# Patient Record
Sex: MALE | Race: ASIAN | NOT HISPANIC OR LATINO | URBAN - METROPOLITAN AREA
[De-identification: names, ages, dates, MRNs, and addresses within clinical notes are randomized per-mention and may not be internally consistent; named-entity substitution may affect disease eponyms.]

---

## 2017-05-30 ENCOUNTER — INPATIENT (INPATIENT)
Facility: HOSPITAL | Age: 57
LOS: 2 days | Discharge: HOME | End: 2017-06-02
Attending: INTERNAL MEDICINE | Admitting: INTERNAL MEDICINE
Payer: MEDICAID

## 2017-05-30 VITALS
RESPIRATION RATE: 18 BRPM | TEMPERATURE: 98 F | OXYGEN SATURATION: 99 % | DIASTOLIC BLOOD PRESSURE: 81 MMHG | HEART RATE: 106 BPM | WEIGHT: 139.99 LBS | SYSTOLIC BLOOD PRESSURE: 122 MMHG | HEIGHT: 64 IN

## 2017-05-30 LAB
ALBUMIN SERPL ELPH-MCNC: 2.6 G/DL — LOW (ref 3.3–5)
ALP SERPL-CCNC: 171 U/L — HIGH (ref 40–120)
ALT FLD-CCNC: 37 U/L — SIGNIFICANT CHANGE UP (ref 12–78)
ANION GAP SERPL CALC-SCNC: 11 MMOL/L — SIGNIFICANT CHANGE UP (ref 5–17)
APPEARANCE UR: CLEAR — SIGNIFICANT CHANGE UP
APTT BLD: 33.3 SEC — SIGNIFICANT CHANGE UP (ref 27.5–37.4)
AST SERPL-CCNC: 43 U/L — HIGH (ref 15–37)
BASOPHILS # BLD AUTO: 0.1 K/UL — SIGNIFICANT CHANGE UP (ref 0–0.2)
BASOPHILS NFR BLD AUTO: 0.6 % — SIGNIFICANT CHANGE UP (ref 0–2)
BILIRUB SERPL-MCNC: 0.5 MG/DL — SIGNIFICANT CHANGE UP (ref 0.2–1.2)
BILIRUB UR-MCNC: NEGATIVE — SIGNIFICANT CHANGE UP
BLD GP AB SCN SERPL QL: SIGNIFICANT CHANGE UP
BUN SERPL-MCNC: 42 MG/DL — HIGH (ref 7–23)
CALCIUM SERPL-MCNC: 8.4 MG/DL — LOW (ref 8.5–10.1)
CHLORIDE SERPL-SCNC: 96 MMOL/L — SIGNIFICANT CHANGE UP (ref 96–108)
CK MB BLD-MCNC: <0.6 % — SIGNIFICANT CHANGE UP (ref 0–3.5)
CK MB CFR SERPL CALC: <0.5 NG/ML — SIGNIFICANT CHANGE UP (ref 0.5–3.6)
CK SERPL-CCNC: 79 U/L — SIGNIFICANT CHANGE UP (ref 26–308)
CO2 SERPL-SCNC: 25 MMOL/L — SIGNIFICANT CHANGE UP (ref 22–31)
COLOR SPEC: YELLOW — SIGNIFICANT CHANGE UP
CREAT SERPL-MCNC: 1.85 MG/DL — HIGH (ref 0.5–1.3)
DIFF PNL FLD: NEGATIVE — SIGNIFICANT CHANGE UP
EOSINOPHIL # BLD AUTO: 0.1 K/UL — SIGNIFICANT CHANGE UP (ref 0–0.5)
EOSINOPHIL NFR BLD AUTO: 0.7 % — SIGNIFICANT CHANGE UP (ref 0–6)
ERYTHROCYTE [SEDIMENTATION RATE] IN BLOOD: 91 MM/HR — HIGH (ref 0–20)
GLUCOSE SERPL-MCNC: 150 MG/DL — HIGH (ref 70–99)
GLUCOSE UR QL: NEGATIVE MG/DL — SIGNIFICANT CHANGE UP
HCT VFR BLD CALC: 32.2 % — LOW (ref 39–50)
HGB BLD-MCNC: 10.8 G/DL — LOW (ref 13–17)
INR BLD: 1.36 RATIO — HIGH (ref 0.88–1.16)
KETONES UR-MCNC: NEGATIVE — SIGNIFICANT CHANGE UP
LACTATE SERPL-SCNC: 1.4 MMOL/L — SIGNIFICANT CHANGE UP (ref 0.7–2)
LEUKOCYTE ESTERASE UR-ACNC: NEGATIVE — SIGNIFICANT CHANGE UP
LYMPHOCYTES # BLD AUTO: 1.3 K/UL — SIGNIFICANT CHANGE UP (ref 1–3.3)
LYMPHOCYTES # BLD AUTO: 11.3 % — LOW (ref 13–44)
MCHC RBC-ENTMCNC: 23.9 PG — LOW (ref 27–34)
MCHC RBC-ENTMCNC: 33.4 GM/DL — SIGNIFICANT CHANGE UP (ref 32–36)
MCV RBC AUTO: 71.5 FL — LOW (ref 80–100)
MONOCYTES # BLD AUTO: 0.9 K/UL — SIGNIFICANT CHANGE UP (ref 0–0.9)
MONOCYTES NFR BLD AUTO: 7.8 % — SIGNIFICANT CHANGE UP (ref 2–14)
NEUTROPHILS # BLD AUTO: 8.9 K/UL — HIGH (ref 1.8–7.4)
NEUTROPHILS NFR BLD AUTO: 79.5 % — HIGH (ref 43–77)
NITRITE UR-MCNC: NEGATIVE — SIGNIFICANT CHANGE UP
PH UR: 5 — SIGNIFICANT CHANGE UP (ref 5–8)
PLATELET # BLD AUTO: 350 K/UL — SIGNIFICANT CHANGE UP (ref 150–400)
POTASSIUM SERPL-MCNC: 4.1 MMOL/L — SIGNIFICANT CHANGE UP (ref 3.5–5.3)
POTASSIUM SERPL-SCNC: 4.1 MMOL/L — SIGNIFICANT CHANGE UP (ref 3.5–5.3)
PROT SERPL-MCNC: 8.4 GM/DL — HIGH (ref 6–8.3)
PROT UR-MCNC: 30 MG/DL
PROTHROM AB SERPL-ACNC: 14.9 SEC — HIGH (ref 9.8–12.7)
RBC # BLD: 4.5 M/UL — SIGNIFICANT CHANGE UP (ref 4.2–5.8)
RBC # FLD: 14.7 % — SIGNIFICANT CHANGE UP (ref 11–15)
SODIUM SERPL-SCNC: 132 MMOL/L — LOW (ref 135–145)
SP GR SPEC: 1.01 — SIGNIFICANT CHANGE UP (ref 1.01–1.02)
TROPONIN I SERPL-MCNC: <.015 NG/ML — SIGNIFICANT CHANGE UP (ref 0.01–0.04)
UROBILINOGEN FLD QL: NEGATIVE MG/DL — SIGNIFICANT CHANGE UP
WBC # BLD: 11.2 K/UL — HIGH (ref 3.8–10.5)
WBC # FLD AUTO: 11.2 K/UL — HIGH (ref 3.8–10.5)

## 2017-05-30 PROCEDURE — 70551 MRI BRAIN STEM W/O DYE: CPT | Mod: 26

## 2017-05-30 PROCEDURE — 99285 EMERGENCY DEPT VISIT HI MDM: CPT | Mod: 25

## 2017-05-30 PROCEDURE — 62270 DX LMBR SPI PNXR: CPT

## 2017-05-30 PROCEDURE — 72148 MRI LUMBAR SPINE W/O DYE: CPT | Mod: 26

## 2017-05-30 PROCEDURE — 71010: CPT | Mod: 26

## 2017-05-30 PROCEDURE — 70450 CT HEAD/BRAIN W/O DYE: CPT | Mod: 26

## 2017-05-30 PROCEDURE — 72100 X-RAY EXAM L-S SPINE 2/3 VWS: CPT | Mod: 26

## 2017-05-30 RX ORDER — MORPHINE SULFATE 50 MG/1
4 CAPSULE, EXTENDED RELEASE ORAL ONCE
Qty: 0 | Refills: 0 | Status: DISCONTINUED | OUTPATIENT
Start: 2017-05-30 | End: 2017-05-30

## 2017-05-30 RX ORDER — SODIUM CHLORIDE 9 MG/ML
1000 INJECTION INTRAMUSCULAR; INTRAVENOUS; SUBCUTANEOUS ONCE
Qty: 0 | Refills: 0 | Status: COMPLETED | OUTPATIENT
Start: 2017-05-30 | End: 2017-05-30

## 2017-05-30 RX ADMIN — SODIUM CHLORIDE 2000 MILLILITER(S): 9 INJECTION INTRAMUSCULAR; INTRAVENOUS; SUBCUTANEOUS at 23:57

## 2017-05-30 RX ADMIN — MORPHINE SULFATE 4 MILLIGRAM(S): 50 CAPSULE, EXTENDED RELEASE ORAL at 23:57

## 2017-05-30 NOTE — ED PROVIDER NOTE - PHYSICAL EXAMINATION
Gen: Alert, NAD  Head: NC, AT, PERRL, EOMI, normal lids/conjunctiva  ENT: B TM WNL, normal hearing, patent oropharynx without erythema/exudate, uvula midline  Neck: +supple, no tenderness/meningismus/JVD, +Trachea midline  Pulm: Bilateral BS, normal resp effort, no wheeze/stridor/retractions  CV: RRR, no M/R/G, +dist pulses  Abd: soft, NT/ND, +BS, no hepatosplenomegaly  Mskel: strength 5/5 throughout.  sensation intact.  bilateral knee swelling and unable to bend knees.  not erythematous or warm to touch.    Neuro: AAOx3, no sensory/motor deficits, CN 2-12 intact

## 2017-05-30 NOTE — ED ADULT NURSE NOTE - CHIEF COMPLAINT QUOTE
"Pain from the waist down" Reports having pain from waist down starting 4 days ago, with urinary difficulties and trouble walking. Denies fall, trauma, injury, fever, chills, nausea, vomiting.

## 2017-05-30 NOTE — ED PROVIDER NOTE - OBJECTIVE STATEMENT
57yoM; with no signif pmh; now p/w back pain and lower extremity weakness. patient states he has been having back pain--lower back pain, x3 days, progressively worsening, with LE pain and weakness. denies numbness/tingling. c/o bowel and bladder retention...difficulty but able to go. also c/o bilateral upper extremity muscular atrophy and weakness. denies f/c/s. denies recent viral illness. denies trauma. denies travel. 57yoM; with no signif pmh; now p/w back pain and lower extremity weakness. patient states he has been having back pain--lower back pain, x3 days, progressively worsening, with LE pain and weakness. denies numbness/tingling. c/o bowel and bladder retention...difficulty but able to go. also c/o bilateral upper extremity muscular atrophy and weakness. also c/o left eye blurry vision intermittently.  denies f/c/s. denies recent viral illness. denies trauma. denies travel.

## 2017-05-31 DIAGNOSIS — G61.0 GUILLAIN-BARRE SYNDROME: ICD-10-CM

## 2017-05-31 DIAGNOSIS — M25.461 EFFUSION, RIGHT KNEE: ICD-10-CM

## 2017-05-31 DIAGNOSIS — N28.9 DISORDER OF KIDNEY AND URETER, UNSPECIFIED: ICD-10-CM

## 2017-05-31 DIAGNOSIS — Z29.9 ENCOUNTER FOR PROPHYLACTIC MEASURES, UNSPECIFIED: ICD-10-CM

## 2017-05-31 DIAGNOSIS — Z11.59 ENCOUNTER FOR SCREENING FOR OTHER VIRAL DISEASES: ICD-10-CM

## 2017-05-31 LAB
APPEARANCE CSF: CLEAR — SIGNIFICANT CHANGE UP
APPEARANCE CSF: CLEAR — SIGNIFICANT CHANGE UP
APPEARANCE SPUN FLD: COLORLESS — SIGNIFICANT CHANGE UP
APPEARANCE SPUN FLD: COLORLESS — SIGNIFICANT CHANGE UP
BACTERIA # UR AUTO: ABNORMAL
CHOLEST SERPL-MCNC: 156 MG/DL — SIGNIFICANT CHANGE UP (ref 10–199)
CMV IGG FLD QL: 4.6 U/ML — HIGH
CMV IGG SERPL-IMP: POSITIVE
CMV IGM FLD-ACNC: <8 AU/ML — SIGNIFICANT CHANGE UP
CMV IGM SERPL QL: NEGATIVE — SIGNIFICANT CHANGE UP
COLOR CSF: SIGNIFICANT CHANGE UP
COLOR CSF: SIGNIFICANT CHANGE UP
CRP SERPL-MCNC: 25.2 MG/DL — HIGH (ref 0–0.4)
EBV EA AB SER IA-ACNC: <5 U/ML — SIGNIFICANT CHANGE UP
EBV EA AB TITR SER IF: POSITIVE
EBV EA IGG SER-ACNC: NEGATIVE — SIGNIFICANT CHANGE UP
EBV NA IGG SER IA-ACNC: 75.7 U/ML — HIGH
EBV PATRN SPEC IB-IMP: SIGNIFICANT CHANGE UP
EBV VCA IGG AVIDITY SER QL IA: POSITIVE
EBV VCA IGM SER IA-ACNC: 169 U/ML — HIGH
EBV VCA IGM SER IA-ACNC: <10 U/ML — SIGNIFICANT CHANGE UP
EBV VCA IGM TITR FLD: NEGATIVE — SIGNIFICANT CHANGE UP
EPI CELLS # UR: SIGNIFICANT CHANGE UP
GLUCOSE CSF-MCNC: 59 MG/DL — SIGNIFICANT CHANGE UP (ref 40–70)
GRAM STN FLD: SIGNIFICANT CHANGE UP
GRAN CASTS # UR COMP ASSIST: ABNORMAL /LPF
HBA1C BLD-MCNC: 6.6 % — HIGH (ref 4–5.6)
HCT VFR BLD CALC: 28.3 % — LOW (ref 39–50)
HDLC SERPL-MCNC: 7 MG/DL — LOW (ref 40–125)
HGB BLD-MCNC: 9.4 G/DL — LOW (ref 13–17)
HIV 1+2 AB+HIV1 P24 AG SERPL QL IA: SIGNIFICANT CHANGE UP
HSV1 IGG SER-ACNC: 26.1 INDEX — HIGH
HSV1 IGG SERPL QL IA: POSITIVE
HSV2 IGG FLD-ACNC: 0.2 INDEX — SIGNIFICANT CHANGE UP
HSV2 IGG SERPL QL IA: NEGATIVE — SIGNIFICANT CHANGE UP
HYALINE CASTS # UR AUTO: ABNORMAL /LPF
LABORATORY COMMENT REPORT: SIGNIFICANT CHANGE UP
LIPID PNL WITH DIRECT LDL SERPL: 100 MG/DL — SIGNIFICANT CHANGE UP
M PNEUMO IGG SER IA-ACNC: 0.79 INDEX — SIGNIFICANT CHANGE UP
M PNEUMO IGG SER IA-ACNC: NEGATIVE — SIGNIFICANT CHANGE UP
MCHC RBC-ENTMCNC: 23.6 PG — LOW (ref 27–34)
MCHC RBC-ENTMCNC: 33.2 GM/DL — SIGNIFICANT CHANGE UP (ref 32–36)
MCV RBC AUTO: 70.9 FL — LOW (ref 80–100)
NEUTROPHILS # CSF: 0 % — SIGNIFICANT CHANGE UP (ref 0–6)
NEUTROPHILS # CSF: SIGNIFICANT CHANGE UP (ref 0–6)
NRBC NFR CSF: 0 /UL — SIGNIFICANT CHANGE UP (ref 0–5)
NRBC NFR CSF: 71 /UL — HIGH (ref 0–5)
PLATELET # BLD AUTO: 330 K/UL — SIGNIFICANT CHANGE UP (ref 150–400)
PROT CSF-MCNC: 31 MG/DL — SIGNIFICANT CHANGE UP (ref 15–45)
RBC # BLD: 3.98 M/UL — LOW (ref 4.2–5.8)
RBC # CSF: 2 /UL — HIGH (ref 0–0)
RBC # CSF: 3 /UL — HIGH (ref 0–0)
RBC # FLD: 15.4 % — HIGH (ref 11–15)
RBC CASTS # UR COMP ASSIST: SIGNIFICANT CHANGE UP /HPF (ref 0–4)
RHEUMATOID FACT SERPL-ACNC: 23 IU/ML — HIGH (ref 0–13.9)
SOURCE HSV 1/2: SIGNIFICANT CHANGE UP
SPECIMEN SOURCE: SIGNIFICANT CHANGE UP
TOTAL CHOLESTEROL/HDL RATIO MEASUREMENT: 22 RATIO — HIGH (ref 3.4–9.6)
TRIGL SERPL-MCNC: 244 MG/DL — HIGH (ref 10–149)
TUBE TYPE: SIGNIFICANT CHANGE UP
TUBE TYPE: SIGNIFICANT CHANGE UP
WBC # BLD: 8.7 K/UL — SIGNIFICANT CHANGE UP (ref 3.8–10.5)
WBC # FLD AUTO: 8.7 K/UL — SIGNIFICANT CHANGE UP (ref 3.8–10.5)
WBC UR QL: SIGNIFICANT CHANGE UP

## 2017-05-31 PROCEDURE — 73562 X-RAY EXAM OF KNEE 3: CPT | Mod: 26,50

## 2017-05-31 PROCEDURE — 99223 1ST HOSP IP/OBS HIGH 75: CPT

## 2017-05-31 PROCEDURE — 74176 CT ABD & PELVIS W/O CONTRAST: CPT | Mod: 26

## 2017-05-31 RX ORDER — SODIUM CHLORIDE 9 MG/ML
1000 INJECTION INTRAMUSCULAR; INTRAVENOUS; SUBCUTANEOUS
Qty: 0 | Refills: 0 | Status: DISCONTINUED | OUTPATIENT
Start: 2017-05-31 | End: 2017-06-02

## 2017-05-31 RX ORDER — IOHEXOL 300 MG/ML
30 INJECTION, SOLUTION INTRAVENOUS ONCE
Qty: 0 | Refills: 0 | Status: COMPLETED | OUTPATIENT
Start: 2017-05-31 | End: 2017-05-31

## 2017-05-31 RX ORDER — IOHEXOL 300 MG/ML
50 INJECTION, SOLUTION INTRAVENOUS ONCE
Qty: 0 | Refills: 0 | Status: DISCONTINUED | OUTPATIENT
Start: 2017-05-31 | End: 2017-05-31

## 2017-05-31 RX ORDER — HEPARIN SODIUM 5000 [USP'U]/ML
5000 INJECTION INTRAVENOUS; SUBCUTANEOUS EVERY 8 HOURS
Qty: 0 | Refills: 0 | Status: DISCONTINUED | OUTPATIENT
Start: 2017-05-31 | End: 2017-06-02

## 2017-05-31 RX ORDER — SODIUM CHLORIDE 9 MG/ML
1000 INJECTION INTRAMUSCULAR; INTRAVENOUS; SUBCUTANEOUS ONCE
Qty: 0 | Refills: 0 | Status: COMPLETED | OUTPATIENT
Start: 2017-05-31 | End: 2017-05-31

## 2017-05-31 RX ORDER — ACETAMINOPHEN 500 MG
650 TABLET ORAL EVERY 6 HOURS
Qty: 0 | Refills: 0 | Status: DISCONTINUED | OUTPATIENT
Start: 2017-05-31 | End: 2017-06-02

## 2017-05-31 RX ADMIN — Medication 40 MILLIGRAM(S): at 17:47

## 2017-05-31 RX ADMIN — MORPHINE SULFATE 4 MILLIGRAM(S): 50 CAPSULE, EXTENDED RELEASE ORAL at 00:30

## 2017-05-31 RX ADMIN — SODIUM CHLORIDE 80 MILLILITER(S): 9 INJECTION INTRAMUSCULAR; INTRAVENOUS; SUBCUTANEOUS at 08:47

## 2017-05-31 RX ADMIN — SODIUM CHLORIDE 2000 MILLILITER(S): 9 INJECTION INTRAMUSCULAR; INTRAVENOUS; SUBCUTANEOUS at 06:05

## 2017-05-31 RX ADMIN — HEPARIN SODIUM 5000 UNIT(S): 5000 INJECTION INTRAVENOUS; SUBCUTANEOUS at 21:44

## 2017-05-31 RX ADMIN — IOHEXOL 30 MILLILITER(S): 300 INJECTION, SOLUTION INTRAVENOUS at 17:46

## 2017-05-31 RX ADMIN — Medication 650 MILLIGRAM(S): at 14:33

## 2017-05-31 RX ADMIN — HEPARIN SODIUM 5000 UNIT(S): 5000 INJECTION INTRAVENOUS; SUBCUTANEOUS at 13:51

## 2017-05-31 RX ADMIN — Medication 650 MILLIGRAM(S): at 15:15

## 2017-05-31 NOTE — H&P ADULT - PROBLEM SELECTOR PLAN 4
Regular diet  General precautions   DVT prophylaxis: Heparin 5000 units SC q8h No baseline for comparison   Will start IVF at 80 ml/hr  Consider further workup if no improvement after hydration and other testing above is normal

## 2017-05-31 NOTE — ED ADULT NURSE REASSESSMENT NOTE - NS ED NURSE REASSESS COMMENT FT1
pt stable, not in any distress, admitted for ascending paalysis, vss recorded, report given to RN Roe

## 2017-05-31 NOTE — H&P ADULT - ASSESSMENT
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  Patient will require admission for at least 2 midnights as detailed below:

## 2017-05-31 NOTE — H&P ADULT - NSHPPHYSICALEXAM_GEN_ALL_CORE
GENERAL: NAD, well-groomed, well-developed  HEAD:  Atraumatic, Normocephalic  EYES: EOMI, PERRLA, conjunctiva and sclera clear  ENMT: No tonsillar erythema, exudates, or enlargement; Moist mucous membranes, No lesions  NECK: Supple, No JVD, Normal thyroid  NERVOUS SYSTEM:  Alert & Oriented X3, Good concentration DTRs 2+ intact and symmetric, strength 5/5 throughout, sensation intact throughout  CHEST/LUNG: Clear to auscultation bilaterally; No rales, rhonchi, wheezing, or rubs  HEART: Regular rate and rhythm; No murmurs, rubs, or gallops  ABDOMEN: Soft, Nontender, Nondistended; Bowel sounds present  RECTAL: normal tone, no stool in vault  EXTREMITIES: Decreased distal pulses b/l, right 3rd toe discoloration  LYMPH: No lymphadenopathy noted  SKIN: no rashes or lesions  PSYCH: normal affect and behavior GENERAL: NAD, well-groomed, well-developed  HEAD:  Atraumatic, Normocephalic  EYES: EOMI, PERRLA, conjunctiva and sclera clear  ENMT: No tonsillar erythema, exudates, or enlargement; Moist mucous membranes, No lesions  NECK: Supple, No JVD, Normal thyroid  NERVOUS SYSTEM:  Alert & Oriented X3, Good concentration DTRs 2+ intact and symmetric, strength 5/5 throughout, sensation intact throughout  CHEST/LUNG: Clear to auscultation bilaterally; No rales, rhonchi, wheezing, or rubs  HEART: Regular rate and rhythm; No murmurs, rubs, or gallops  ABDOMEN: Soft, Nontender, Nondistended; Bowel sounds present  RECTAL: normal tone, no stool in vault  EXTREMITIES: No cyanosis, clubbing, edema.  Knee ROM limited due to pain.  LYMPH: No lymphadenopathy noted  SKIN: no rashes or lesions  PSYCH: normal affect and behavior

## 2017-05-31 NOTE — ED ADULT NURSE REASSESSMENT NOTE - NS ED NURSE REASSESS COMMENT FT1
pt stable, LP done, specimen sent to lab, positioned as per protocol, denies any pain or headache , not in any distress, vss, will monitor.

## 2017-05-31 NOTE — H&P ADULT - HISTORY OF PRESENT ILLNESS
57 year old man with no PMH presents with 6 days of upper ext weakness and atrophy, b/l knee pain and swelling, and blurred vision.  He states that these symptoms started suddenly and without any recent viral-like symptoms, travel, dysuria, bowel or bladder incontinence, numbness, tingling, fever, chills, cough, nausea, vomiting, diarrhea, rash, hair loss, weight loss, chest pain, palpitations, SOB, lightheadedness, Vertigo/Ataxia.

## 2017-05-31 NOTE — H&P ADULT - PROBLEM SELECTOR PLAN 5
Regular diet  General precautions   DVT prophylaxis: Heparin 5000 units SC q8h  Will send routine primary care labs as patient does not have PMD (A1C, lipids)

## 2017-05-31 NOTE — H&P ADULT - PROBLEM SELECTOR PLAN 1
Unclear etiology at this time  LP normal at this time, follow results  Follow pending radiology as above  Neuro consult with Dr. 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

## 2017-05-31 NOTE — H&P ADULT - NSHPLABSRESULTS_GEN_ALL_CORE
Vital Signs Last 24 Hrs  T(C): 36.8, Max: 36.8 ( @ 20:07)  T(F): 98.3, Max: 98.3 ( @ 20:07)  HR: 106 (106 - 106)  BP: 122/81 (122/81 - 122/81)  BP(mean): --  RR: 18 (18 - 18)  SpO2: 99% (99% - 99%)      LABS:                        10.8   11.2  )-----------( 350      ( 30 May 2017 21:29 )             32.2         132<L>  |  96  |  42<H>  ----------------------------<  150<H>  4.1   |  25  |  1.85<H>    Ca    8.4<L>      30 May 2017 21:29    TPro  8.4<H>  /  Alb  2.6<L>  /  TBili  0.5  /  DBili  x   /  AST  43<H>  /  ALT  37  /  AlkPhos  171<H>      PT/INR - ( 30 May 2017 21:29 )   PT: 14.9 sec;   INR: 1.36 ratio         PTT - ( 30 May 2017 21:29 )  PTT:33.3 sec  Urinalysis Basic - ( 30 May 2017 23:40 )    Color: Yellow / Appearance: Clear / S.015 / pH: x  Gluc: x / Ketone: Negative  / Bili: Negative / Urobili: Negative mg/dL   Blood: x / Protein: 30 mg/dL / Nitrite: Negative   Leuk Esterase: Negative / RBC: 0-2 /HPF / WBC 0-2   Sq Epi: x / Non Sq Epi: Few / Bacteria: Moderate        RADIOLOGY & ADDITIONAL STUDIES:    CT head without contrast:  No acute intracranial hemorrhage, mass effect, or CT evidence of an acute   vascular territorial infarct.    XR chest, knees, LS pending, no obvious findings, official read pending    MRI brain, LS official read pending, no abnormalities as per [facility] reading

## 2017-06-01 DIAGNOSIS — D47.2 MONOCLONAL GAMMOPATHY: ICD-10-CM

## 2017-06-01 LAB
ANA TITR SER: NEGATIVE — SIGNIFICANT CHANGE UP
ANION GAP SERPL CALC-SCNC: 11 MMOL/L — SIGNIFICANT CHANGE UP (ref 5–17)
BUN SERPL-MCNC: 36 MG/DL — HIGH (ref 7–23)
C TRACH RRNA SPEC QL NAA+PROBE: SIGNIFICANT CHANGE UP
C3 SERPL-MCNC: 156 MG/DL — SIGNIFICANT CHANGE UP (ref 80–180)
C4 SERPL-MCNC: 41 MG/DL — SIGNIFICANT CHANGE UP (ref 10–45)
CALCIUM SERPL-MCNC: 8.5 MG/DL — SIGNIFICANT CHANGE UP (ref 8.5–10.1)
CCP IGG SERPL-ACNC: <8 UNITS — SIGNIFICANT CHANGE UP (ref 0–19)
CHLORIDE SERPL-SCNC: 105 MMOL/L — SIGNIFICANT CHANGE UP (ref 96–108)
CO2 SERPL-SCNC: 21 MMOL/L — LOW (ref 22–31)
CREAT SERPL-MCNC: 1.46 MG/DL — HIGH (ref 0.5–1.3)
GLUCOSE SERPL-MCNC: 215 MG/DL — HIGH (ref 70–99)
IGG SERPL-MCNC: 1060 MG/DL — SIGNIFICANT CHANGE UP (ref 767–1590)
IGG1 SER-MCNC: 437 MG/DL — SIGNIFICANT CHANGE UP (ref 341–894)
IGG2 SER-MCNC: 514 MG/DL — SIGNIFICANT CHANGE UP (ref 171–632)
IGG3 SER-MCNC: 78.1 MG/DL — SIGNIFICANT CHANGE UP (ref 18.4–106)
IGG4 SER-MCNC: 77.9 MG/DL — SIGNIFICANT CHANGE UP (ref 2.4–121)
KAPPA LC SER QL IFE: 3.89 MG/DL — HIGH (ref 0.33–1.94)
KAPPA/LAMBDA FREE LIGHT CHAIN RATIO, SERUM: 1.22 RATIO — SIGNIFICANT CHANGE UP (ref 0.26–1.65)
LAMBDA LC SER QL IFE: 3.19 MG/DL — HIGH (ref 0.57–2.63)
M PNEUMO IGM SER-ACNC: 108 UNITS/ML — SIGNIFICANT CHANGE UP
M PROTEIN 24H UR ELPH-MRATE: SIGNIFICANT CHANGE UP
MYCOPLASMA AG SPEC QL: NEGATIVE — SIGNIFICANT CHANGE UP
N GONORRHOEA RRNA SPEC QL NAA+PROBE: SIGNIFICANT CHANGE UP
POTASSIUM SERPL-MCNC: 5.3 MMOL/L — SIGNIFICANT CHANGE UP (ref 3.5–5.3)
POTASSIUM SERPL-SCNC: 5.3 MMOL/L — SIGNIFICANT CHANGE UP (ref 3.5–5.3)
PROT SERPL-MCNC: 6.5 G/DL — SIGNIFICANT CHANGE UP (ref 6–8.3)
PROT SERPL-MCNC: 6.5 G/DL — SIGNIFICANT CHANGE UP (ref 6–8.3)
RF+CCP IGG SER-IMP: NEGATIVE — SIGNIFICANT CHANGE UP
SODIUM SERPL-SCNC: 137 MMOL/L — SIGNIFICANT CHANGE UP (ref 135–145)
SPECIMEN SOURCE: SIGNIFICANT CHANGE UP
VDRL CSF-TITR: NEGATIVE — SIGNIFICANT CHANGE UP

## 2017-06-01 PROCEDURE — 99233 SBSQ HOSP IP/OBS HIGH 50: CPT

## 2017-06-01 RX ADMIN — HEPARIN SODIUM 5000 UNIT(S): 5000 INJECTION INTRAVENOUS; SUBCUTANEOUS at 05:54

## 2017-06-01 RX ADMIN — SODIUM CHLORIDE 80 MILLILITER(S): 9 INJECTION INTRAMUSCULAR; INTRAVENOUS; SUBCUTANEOUS at 11:16

## 2017-06-01 RX ADMIN — HEPARIN SODIUM 5000 UNIT(S): 5000 INJECTION INTRAVENOUS; SUBCUTANEOUS at 22:56

## 2017-06-01 RX ADMIN — Medication 40 MILLIGRAM(S): at 05:51

## 2017-06-01 RX ADMIN — HEPARIN SODIUM 5000 UNIT(S): 5000 INJECTION INTRAVENOUS; SUBCUTANEOUS at 14:20

## 2017-06-01 NOTE — PHYSICAL THERAPY INITIAL EVALUATION ADULT - GAIT DEVIATIONS NOTED, PT EVAL
+ sway, B/L knee hyperextension noted/decreased stride length/decreased gillian/decreased weight-shifting ability/decreased step length

## 2017-06-01 NOTE — CONSULT NOTE ADULT - ASSESSMENT
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 When acute health issues are resolved, will treat ureterocele and stone.  Thanks  Discussed with patient ion detail

## 2017-06-01 NOTE — CONSULT NOTE ADULT - SUBJECTIVE AND OBJECTIVE BOX
Subjective Complaints:  Historian:       Consult requested by ER doctor:                  Attending:     HPI:  57 year old man with no PMH presents with 6 days of upper ext weakness and atrophy, b/l knee pain and swelling, and blurred vision.  He states that these symptoms started suddenly and without any recent viral-like symptoms, travel, dysuria, bowel or bladder incontinence, numbness, tingling, fever, chills, cough, nausea, vomiting, diarrhea, rash, hair loss, weight loss, chest pain, palpitations, SOB, lightheadedness, Vertigo/Ataxia. (31 May 2017 06:29) Patient said he could not walk because of pain in his legs     BRIAN ZAPATA    PAST MEDICAL & SURGICAL HISTORY:  No pertinent past medical history  No significant past surgical history  57yMale    MEDICATIONS  (STANDING):  heparin  Injectable 5000Unit(s) SubCutaneous every 8 hours  sodium chloride 0.9%. 1000milliLiter(s) IV Continuous <Continuous>  predniSONE   Tablet 40milliGRAM(s) Oral daily    MEDICATIONS  (PRN):  acetaminophen   Tablet. 650milliGRAM(s) Oral every 6 hours PRN Mild Pain (1 - 3)      Allergies    No Known Allergies    Intolerances      FAMILY HISTORY:  No pertinent family history in first degree relatives      REVIEW OF SYSTEMS:  General:  No wt loss, fevers, chills, night sweats  Eyes:  Good vision, no reported pain  ENT:  No sore throat, pain, runny nose, dysphagia  CV:  No pain, palpitatioins, hypo/hypertension  Resp:  No dyspnea, cough, tachypnea, wheezing  GI:  No pain, nausea, vomiting, diarrhea, constipatiion  :  No pain, bleeding, incontinence, nocturia  Muscle:  Non more pain, weakness  Breast:  No pain, abscess, mass, discharge  Neuro:  No more weakness, tingling, memory problems  Psych:  No fatigue, insomnia, mood problems, depression  Endocrine:  No polyuria, polydypsia, cold/heat intolerance  Heme:  No petechiae, ecchymosis, easy bruisability  Skin:  No rash, tattoos, scars, edema      Vital Signs Last 24 Hrs  T(C): 36.6, Max: 37.3 ( @ 23:08)  T(F): 97.8, Max: 99.2 ( @ 23:08)  HR: 92 (74 - 102)  BP: 124/83 (117/69 - 153/95)  BP(mean): --  RR: 16 (16 - 18)  SpO2: 100% (98% - 100%)    GENERAL PHYSICAL EXAM:  General:  Appears stated age, well-groomed, well-nourished, no distress  HEENT:  NC/AT, patent nares w/ pink mucosa, OP clear w/o lesions, PERRL, EOMI, conjunctivae clear, no thyromegaly, nodules, adenopathy, no JVD  Chest:  Full & symmetric excursion, no increased effort, breath sounds clear  Cardiovascular:  Regular rhythm, S1, S2, no murmur/rub/S3/S4, no carotid/femoral/abdominal bruit, radial/pedal pulses 2+, no edema  Abdomen:  Soft, non-tender, non-distended, normoactive bowel sounds, no HSM  Extremities:  Gait & station:   Digits:   Nails:   Joints, Bones, Muscles:   ROM:   Stability:  Skin:  No rash/erythema/ecchymoses/petechiae/wounds/abscess/warm/dry  Musculoskeletal:  Full ROM in all joints w/o swelling/tenderness/effusion    NEUROLOGICAL EXAM:  HENT:  Normocephalic head; atraumatic head.  Neck supple.  ENT: normal looking.  Mental State:    Alert.  Fully oriented to person, place and date.  Coherent.  Speech clear and intact.  Cooperative.  Responds appropriately.    Cranial Nerves:  II-XII:   Pupils round and reactive to light and accommodation.  Extraocular movements full.  Visual fields full (no homonymous hemianopsia).  Visual acuity wnl.  Facial symmetry intact.  Tongue midline.  Motor Functions:  Motor strength is 5/5 throughout     Sensory Functions:   Intact to touch and pinprick to face and extremities.    Reflexes:  Deep tendon reflexes normoactive to biceps, knees and ankles.  Babinski absent (present).  Cerebellar Testing:    Finger to nose intact.  Nystagmus absent.  Neurovascular: Carotid auscultation full without bruits.      LABS:                        9.4    8.7   )-----------( 330      ( 31 May 2017 08:18 )             28.3     06-01    137  |  105  |  36<H>  ----------------------------<  215<H>  5.3   |  21<L>  |  1.46<H>    Ca    8.5      2017 06:24    TPro  6.5  /  Alb  x   /  TBili  x   /  DBili  x   /  AST  x   /  ALT  x   /  AlkPhos  x       PT/INR - ( 30 May 2017 21:29 )   PT: 14.9 sec;   INR: 1.36 ratio         PTT - ( 30 May 2017 21:29 )  PTT:33.3 sec    Urinalysis Basic - ( 30 May 2017 23:40 )    Color: Yellow / Appearance: Clear / S.015 / pH: x  Gluc: x / Ketone: Negative  / Bili: Negative / Urobili: Negative mg/dL   Blood: x / Protein: 30 mg/dL / Nitrite: Negative   Leuk Esterase: Negative / RBC: 0-2 /HPF / WBC 0-2   Sq Epi: x / Non Sq Epi: Few / Bacteria: Moderate        RADIOLOGY & ADDITIONAL STUDIES:    Total Protein, Random Urine: 20:30 ( @ 09:05)  Diet, Consistent Carbohydrate/No Snacks ( @ 11:08)  Consult- OT Evaluate and Treat:   *Reason for Consult - Must select at least one choice*     ADL  Weight Bearing Restrictions: No ( @ 11:11) [completed]  Chloride, Random Urine: Routine ( @ 14:27)  Sodium, Random Urine: Routine ( @ 14:27)  Potassium, Random Urine: Routine ( @ 14:27)  Protein/Creatinine Ratio, Urine: Routine ( @ 14:27)  Complete Blood Count: AM Sched. Collection: 2017 05:00 ( @ 19:17)  Comprehensive Metabolic Panel: AM Sched. Collection: 2017 05:00 ( @ 19:17)      Assessment & Opinion:57 y o with bilateral lower extremity pain and weaknes  was found to have an unremarkable MRI of the brain and of the lumbar spine and his condition has improved .Myositis?     Recommendations: Patient is now able to walk and he no longer has anypain in the lower extremities .He can be discharged with outpatient physical therapy

## 2017-06-01 NOTE — OCCUPATIONAL THERAPY INITIAL EVALUATION ADULT - ANTICIPATED DISCHARGE DISPOSITION, OT EVAL
Home vs acute rehab pending improvement with balance and functional ambulation/stair assessment from physical therapy

## 2017-06-01 NOTE — DIETITIAN INITIAL EVALUATION ADULT. - PERTINENT LABORATORY DATA
06-01 Na 137 mmol/L Glu 215 mg/dL<H> K+ 5.3 mmol/L Cr  1.46 mg/dL<H> BUN 36 mg/dL<H> Phos n/a   Alb n/a   PAB n/a   (5/31) Hgb 9.4   Hct 28.3, CRP=25.20(5/31), RgwY9H=8.6%(5/31).

## 2017-06-01 NOTE — PHYSICAL THERAPY INITIAL EVALUATION ADULT - IMPAIRED TRANSFERS: SIT/STAND, REHAB EVAL
impaired postural control/decreased ROM/impaired balance/decreased strength/impaired motor control/impaired coordination

## 2017-06-01 NOTE — PHYSICAL THERAPY INITIAL EVALUATION ADULT - PERTINENT HX OF CURRENT PROBLEM, REHAB EVAL
Pt is a 56yo M admitted with UE/LE weakness, knee pain, & visual disturbances. Work-up for Jose's syndrome. All imaging negative. A1c: 6.6.

## 2017-06-01 NOTE — PHYSICAL THERAPY INITIAL EVALUATION ADULT - PLANNED THERAPY INTERVENTIONS, PT EVAL
manual therapy techniques/motor coordination training/bed mobility training/ROM/balance training/gait training/strengthening/transfer training/neuromuscular re-education

## 2017-06-01 NOTE — CONSULT NOTE ADULT - SUBJECTIVE AND OBJECTIVE BOX
HPI:  Came to hospital with several days of LE weakness and bilateral knee pain.  Had a spinal tap and etiology of LE weakness still not clear, but getting worked up.  Admit blood work showed a serum Cr of 1.85 which is now 1.46 with empiric IV hydration.  He denies any kidney disease or nephrolithiasis.  This is consistent with mild ELISSA.  Incidental large stone found in bladder extending into left ureter with mild hydroureteronephrosis.  He denies hematuria, passing gravel/debris or dysuria/frequency.      ROS:  see above; also denies fever/chill, dyspnea, abdominal pain, vomiting, diarrhea, UE/hand weakness or any other arthalgia;  a total of 10 systems were reviewed and remainder are negative.      PAST MEDICAL & SURGICAL HISTORY:  No pertinent past medical history  No significant past surgical history      SOCIAL HISTORY:  non smoker    FAMILY HISTORY:  no history of CKD or nephrolithiasis    MEDICATIONS  (STANDING):  heparin  Injectable 5000Unit(s) SubCutaneous every 8 hours  sodium chloride 0.9%. 1000milliLiter(s) IV Continuous <Continuous>  predniSONE   Tablet 40milliGRAM(s) Oral daily  was on weekly vitamin D, ergocalciferol?      PHYSICAL EXAMINATION:  T(F): 98  HR: 99  BP: 145/90  RR: 16  SpO2: 98%  Conversant, no apparent distress  PERRLA, pink conjunctivae, no ptosis  Good dentition, no pharyngeal erythema  Neck non tender, no mass, no thyromegaly or nodules  Normal respiratory effort, lungs clear to auscultation  Heart with RRR, no murmurs or rubs, no peripheral edema  Abdomen soft, no masses, no organomegaly  Skin no rashes, ulcers or lesions, normal turgor and temperature  Appropriate affect, AO x 3    LABS:                        9.4    8.7   )-----------( 330      ( 31 May 2017 08:18 )             28.3     06-    137  |  105  |  36<H>  ----------------------------<  215<H>  5.3   |  21<L>  |  1.46<H>    Ca    8.5      2017 06:24    TPro  8.4<H>  /  Alb  2.6<L>  /  TBili  0.5  /  DBili  x   /  AST  43<H>  /  ALT  37  /  AlkPhos  171<H>  05-30    Urinalysis Basic - ( 30 May 2017 23:40 )    Color: Yellow / Appearance: Clear / S.015 / pH: x  Gluc: x / Ketone: Negative  / Bili: Negative / Urobili: Negative mg/dL   Blood: x / Protein: 30 mg/dL / Nitrite: Negative   Leuk Esterase: Negative / RBC: 0-2 /HPF / WBC 0-2   Sq Epi: x / Non Sq Epi: Few / Bacteria: Moderate        RADIOLOGY:  CT scan personally reviewed shows large calculus in bladder extending into distal left ureter;  kidneys have preserved cortices

## 2017-06-01 NOTE — OCCUPATIONAL THERAPY INITIAL EVALUATION ADULT - PERTINENT HX OF CURRENT PROBLEM, REHAB EVAL
Patient admitted to [facility] due to ascending paralysis/effusion of both knees. CT of abdomen of pelvis on 5/31/17 revealed Large staghorn-like calculus in the bladder and distal left ureter measuring approximately 3.3 cm. Most of the stone is within the bladder. X-ray on 5/31/17 of bilateral knees revealed Bilateral knee joint effusions.

## 2017-06-01 NOTE — PHYSICAL THERAPY INITIAL EVALUATION ADULT - IMPAIRMENTS CONTRIBUTING TO GAIT DEVIATIONS, PT EVAL
decreased ROM/impaired coordination/decreased strength/impaired balance/impaired postural control/impaired motor control

## 2017-06-01 NOTE — CONSULT NOTE ADULT - SUBJECTIVE AND OBJECTIVE BOX
HPI:  57 year old man with no PMH presents with 6 days of upper ext weakness and atrophy, b/l knee pain and swelling, and blurred vision.  He states that these symptoms started suddenly and without any recent viral-like symptoms, travel, dysuria, bowel or bladder incontinence, numbness, tingling, fever, chills, cough, nausea, vomiting, diarrhea, rash, hair loss, weight loss, chest pain, palpitations, SOB, lightheadedness, Vertigo/Ataxia. (31 May 2017 06:29)    CT scan done to evaluate lumber spine revealed bladder stone , therefore , consultation was sought.. Pt denies any urinary symptoms.      PAST MEDICAL & SURGICAL HISTORY:  No pertinent past medical history  No significant past surgical history      Allergies    No Known Allergies    FAMILY HISTORY:  No pertinent family history in first degree relatives      MEDICATIONS  (STANDING):  heparin  Injectable 5000Unit(s) SubCutaneous every 8 hours  sodium chloride 0.9%. 1000milliLiter(s) IV Continuous <Continuous>  predniSONE   Tablet 40milliGRAM(s) Oral daily    MEDICATIONS  (PRN):  acetaminophen   Tablet. 650milliGRAM(s) Oral every 6 hours PRN Mild Pain (1 - 3)      ROS:    General:  No wt loss, fevers, chills, night sweats  Eyes:  Good vision, no reported pain  ENT:  No sore throat, pain, runny nose, dysphagia  CV:  No pain, palpitatioins, hypo/hypertension  Resp:  No dyspnea, cough, tachypnea, wheezing  GI:  No pain, nausea, vomiting, diarrhea, constipatiion  :  No pain, bleeding, incontinence, nocturia, frequency  Muscle:  + weakness  Neuro:  + weakness, tingling, memory problems  Psych:  No fatigue, insomnia, mood problems, depression  Endocrine:  No polyuria, polydypsia, cold/heat intolerance  Heme:  No petechiae, ecchymosis, easy bruisability  Skin:  No rash, tattoos, scars, edema      Physical Exam:    Vital Signs:  Vital Signs Last 24 Hrs  T(C): 36.7, Max: 37.7 ( @ 16:45)  T(F): 98, Max: 99.8 ( @ 16:45)  HR: 102 (74 - 102)  BP: 153/95 (111/69 - 153/95)  BP(mean): --  RR: 18 (16 - 18)  SpO2: 98% (98% - 99%)  Daily     Daily Weight in k.3 (2017 10:47)  I&O's Summary  I & Os for 24h ending 2017 07:00  =============================================  IN: 1800 ml / OUT: 1480 ml / NET: 320 ml    I & Os for current day (as of 2017 16:35)  =============================================  IN: 0 ml / OUT: 700 ml / NET: -700 ml      General:  Appears stated age,  well-nourished, no distress  HEENT:  NC/AT, patent nares w/ pink mucosa, OP moist and pink,  PERRL, EOMI, conjunctivae clear, no thyromegaly, nodules, adenopathy, no JVD  Chest:  Full & symmetric excursion, no increased effort.   Abdomen:  Soft, non-tender, non-distended, normoactive bowel sounds.  Genitalia: Uncircumcised Phallus, Adequate meatus, no hypospadias. Both testicles descended, non tender, no mass. No epididymitis or epididymal mass. mild bilateral  hydroceles.  Rectal Examination: Deferred.  Extremities:  no edema, pedal pusation are present, no calf tenderness.  Skin:  No rash/erythema/ecchymoses/petechiae/wounds/abscess/warm/dry  Musculoskeletal: weakness +  Neuro/Psych:  Alert, oriented,     LABS:                        9.4    8.7   )-----------( 330      ( 31 May 2017 08:18 )             28.3     06-    137  |  105  |  36<H>  ----------------------------<  215<H>  5.3   |  21<L>  |  1.46<H>    Ca    8.5      2017 06:24    TPro  6.5  /  Alb  x   /  TBili  x   /  DBili  x   /  AST  x   /  ALT  x   /  AlkPhos  x       PT/INR - ( 30 May 2017 21:29 )   PT: 14.9 sec;   INR: 1.36 ratio         PTT - ( 30 May 2017 21:29 )  PTT:33.3 sec  Urinalysis Basic - ( 30 May 2017 23:40 )    Color: Yellow / Appearance: Clear / S.015 / pH: x  Gluc: x / Ketone: Negative  / Bili: Negative / Urobili: Negative mg/dL   Blood: x / Protein: 30 mg/dL / Nitrite: Negative   Leuk Esterase: Negative / RBC: 0-2 /HPF / WBC 0-2   Sq Epi: x / Non Sq Epi: Few / Bacteria: Moderate        RADIOLOGY & ADDITIONAL STUDIES:      EXAM:  CT ABDOMEN AND PELVIS OC                            PROCEDURE DATE:  2017        INTERPRETATION:  INDICATIONS:   Abdominal pain  TECHNIQUE:  Sections were obtained from the diaphragm to the pubic   symphysis following oral contrast. Coronal and sagittal reformations were   generated from the axial data.    COMPARISON EXAMINATION:  No prior    FINDINGS:  LUNG BASES:  Scattered fibronodular changes are noted in the lung bases,   left greater than right..    GASTROINTESTINAL STRUCTURES:  Administered oral contrast opacifies the   small bowel. No obstruction is seen. The: There is a feces filled. No   acute inflammatory changes are noted.  HEPATOBILIARY:  The gallbladder is normal in appearance.  No calculus is   visible.  The intrahepatic biliary ducts are not dilated.  The common   duct is normal in size.  LIVER:  Unremarkable in appearance.  SPLEEN:  No enlargement or focal lesion is evident.    PANCREAS:  Atrophic in appearance  ADRENAL GLANDS:  Within normal limits.  KIDNEYS:  A large stone the is noted in the distal left ureter,   protruding into the bladder. The bladder portion of the stone measures   1.9 x 3.3 cm. The distal ureteral stone measures 1 cm in length. There is   mild fullness of the distal left ureter. The hydronephrosis is noted.  AORTA:  Normal in size.   PELVIC ORGANS:  Unremarkable in appearance.  BLADDER:  No wall thickening or filling defect is noted.  No UVJ calculus   is suggested.  BONES:  No evidence of fracture or bony destructive lesion.  MISC:Degenerative changes are noted in the spine    IMPRESSION:       Large staghorn-like calculus in the bladder and distal left ureter   measuring approximately 3.3 cm. Most of the stone is within the bladder.   Further assessment and follow-up recommended. No hydronephrosis.    No acute GI phlegmon abscess or obstruction. No acute upper abdominal   abnormality.              LUCI DAVIS M.D., ATTENDING RADIOLOGIST  This document has been electronically signed. May 31 2017  7:53PM

## 2017-06-01 NOTE — DIETITIAN INITIAL EVALUATION ADULT. - NS AS NUTRI DX BIOCHEMICAL
Glucose & HgbA1C level/Altered nutrition - related laboratory values (specify)/Impaired nutrient utilization

## 2017-06-01 NOTE — OCCUPATIONAL THERAPY INITIAL EVALUATION ADULT - MODIFIED CLINICAL TEST OF SENSORY INTEGRATION IN BALANCE TEST
Barthel Index: Feeding Score___10___, Bathing Score___0___, Grooming Score__5___, Dressing Score__10___, Bowel Score__10___, Bladder Score___10___, Toilet Score__10___, Transfer Score___15___, Mobility Score___0__, Stairs Score__0___, Total Score__70___.

## 2017-06-01 NOTE — PROGRESS NOTE ADULT - SUBJECTIVE AND OBJECTIVE BOX
CHIEF COMPLAINT/INTERVAL HISTORY:    Patient is a 57y old  Male who presents with a chief complaint of weakness x 6 days; back pain radiating to bilateral knees (31 May 2017 10:09)      HPI:  57 year old man with no PMH presents with 6 days of upper ext weakness and atrophy, b/l knee pain and swelling, and blurred vision.  He states that these symptoms started suddenly and without any recent viral-like symptoms, travel, dysuria, bowel or bladder incontinence, numbness, tingling, fever, chills, cough, nausea, vomiting, diarrhea, rash, hair loss, weight loss, chest pain, palpitations, SOB, lightheadedness, Vertigo/Ataxia. (31 May 2017 06:29)    Overnight issues  knee pain better  renal /urology consults appreciated  rheum consult pending   SUBJECTIVE & OBJECTIVE: Pt seen and examined at bedside.   ROS:  CONSTITUTIONAL: No fever, weight loss, or fatigue  NECK: No pain or stiffness  RESPIRATORY: No cough, wheezing, chills or hemoptysis; No shortness of breath  CARDIOVASCULAR: No chest pain, palpitations, dizziness, or leg swelling  GASTROINTESTINAL: No abdominal or epigastric pain. No nausea, vomiting, or hematemesis; No diarrhea or constipation. No melena or hematochezia.  GENITOURINARY: No dysuria, frequency, hematuria, or incontinence  NEUROLOGICAL: No headaches, memory loss, loss of strength, numbness, or tremors  SKIN: No itching, burning, rashes, or lesions   ICU Vital Signs Last 24 Hrs  T(C): 36.7, Max: 37.7 ( @ 16:45)  T(F): 98, Max: 99.8 ( @ 16:45)  HR: 102 (74 - 102)  BP: 153/95 (111/69 - 153/95)  BP(mean): --  ABP: --  ABP(mean): --  RR: 18 (16 - 18)  SpO2: 98% (98% - 99%)        MEDICATIONS  (STANDING):  heparin  Injectable 5000Unit(s) SubCutaneous every 8 hours  sodium chloride 0.9%. 1000milliLiter(s) IV Continuous <Continuous>  predniSONE   Tablet 40milliGRAM(s) Oral daily    MEDICATIONS  (PRN):  acetaminophen   Tablet. 650milliGRAM(s) Oral every 6 hours PRN Mild Pain (1 - 3)        PHYSICAL EXAM:    GENERAL: NAD, well-groomed, well-developed  HEAD:  Atraumatic, Normocephalic  EYES: EOMI, PERRLA, conjunctiva and sclera clear  ENMT: Moist mucous membranes  NECK: Supple, No JVD  NERVOUS SYSTEM:  Alert & Oriented X3, Motor Strength 5/5 B/L upper and lower extremities; DTRs 2+ intact and symmetric  CHEST/LUNG: Clear to auscultation bilaterally; No rales, rhonchi, wheezing, or rubs  HEART: Regular rate and rhythm; No murmurs, rubs, or gallops  ABDOMEN: Soft, Nontender, Nondistended; Bowel sounds present  EXTREMITIES:  2+ Peripheral Pulses, No clubbing, cyanosis, or edema    LABS:                        9.4    8.7   )-----------( 330      ( 31 May 2017 08:18 )             28.3         137  |  105  |  36<H>  ----------------------------<  215<H>  5.3   |  21<L>  |  1.46<H>    Ca    8.5      2017 06:24    TPro  6.5  /  Alb  x   /  TBili  x   /  DBili  x   /  AST  x   /  ALT  x   /  AlkPhos  x       PT/INR - ( 30 May 2017 21:29 )   PT: 14.9 sec;   INR: 1.36 ratio         PTT - ( 30 May 2017 21:29 )  PTT:33.3 sec  Urinalysis Basic - ( 30 May 2017 23:40 )    Color: Yellow / Appearance: Clear / S.015 / pH: x  Gluc: x / Ketone: Negative  / Bili: Negative / Urobili: Negative mg/dL   Blood: x / Protein: 30 mg/dL / Nitrite: Negative   Leuk Esterase: Negative / RBC: 0-2 /HPF / WBC 0-2   Sq Epi: x / Non Sq Epi: Few / Bacteria: Moderate        CAPILLARY BLOOD GLUCOSE      RECENT CULTURES:      RADIOLOGY & ADDITIONAL TESTS:  Imaging Personally Reviewed:  [ ] YES      Consultant(s) Notes Reviewed:  [ ] YES     Care Discussed with [ ] Consultants [X ] Patient [ ] Family  [x ]    [x ]  Other; RN  HEALTH ISSUES - PROBLEM Dx:  Abnormal renal function: Abnormal renal function  Preventive measure: Preventive measure  Need for hepatitis C screening test: Need for hepatitis C screening test  Effusion of both knee joints: Effusion of both knee joints  Ascending paralysis: Ascending paralysis        DVT/GI ppx  Discussed with pt @ bedside

## 2017-06-01 NOTE — PHYSICAL THERAPY INITIAL EVALUATION ADULT - IMPAIRMENTS CONTRIBUTING IMPAIRED BED MOBILITY, REHAB EVAL
impaired coordination/impaired balance/decreased ROM/impaired motor control/impaired postural control/decreased strength

## 2017-06-01 NOTE — OCCUPATIONAL THERAPY INITIAL EVALUATION ADULT - RANGE OF MOTION EXAMINATION, LOWER EXTREMITY
RLE AROM hip flexion WFL, RLE AROM knee flexion 0-80, RLE AROM distally to knee WFL, LLE AROM hip flexion WFL, LLE AROM knee flexion 0-70, LLE AROM distally to knee WFL

## 2017-06-01 NOTE — CONSULT NOTE ADULT - ASSESSMENT
1.  Acute renal failure, mild, prerenal vs ATN vs component of functional left obstruction  2.  Nephrolithiasis with probably obstructed left kidney  3.  Multi system illness (neurologic, rheumatologic) of unclear etiology, not clearly related to kidney problem    PLAN:  Continue IV hydration  Check urinary electrolytes  Stop vitamin D products   consult to remove bladder stone      This consultation utilizes moderate risk medical decision making as evidenced by mild exacerbation of one chronic illness, two stable chronic illnesses, undiagnosed new problem, acute illness with systemic symptoms or prescription drug management.

## 2017-06-01 NOTE — PHYSICAL THERAPY INITIAL EVALUATION ADULT - BALANCE DISTURBANCE, IDENTIFIED IMPAIRMENT CONTRIBUTE, REHAB EVAL
decreased strength/impaired postural control/impaired coordination/impaired motor control/decreased ROM

## 2017-06-01 NOTE — DIETITIAN INITIAL EVALUATION ADULT. - OTHER INFO
Pt seen for RN consult 5/31 for unintended wt loss. Pt lives with friends & friends do cooking & food shopping. Pt states po intake 75% consumed this am & tolerated well. Last BM x 1 (6/1). Pt states intentional wt loss PTA & appetite remains good. No GI distress. No difficulty chewing or swallowing. Pt with no hx DM; Glucose=215(6/1) & GjkQ7W=6.6%(5/31) & reports drinking excessive amounts of juice. Pt seen for RN consult 5/31 for unintended wt loss. Pt lives with friends & friends do cooking & food shopping. Pt states po intake 75% consumed this am & tolerated well. Last BM x 1 (6/1). Pt states intentional wt loss PTA & appetite remains good. No GI distress. No difficulty chewing or swallowing. Pt with no hx DM; Glucose=215(6/1) & HzmF0T=7.6%(5/31) & reports drinking excessive amounts of juice. Pt reports no s/s polyuria or polydipsia.

## 2017-06-01 NOTE — PHYSICAL THERAPY INITIAL EVALUATION ADULT - ACTIVE RANGE OF MOTION EXAMINATION, REHAB EVAL
bilateral upper extremity Active ROM was WFL (within functional limits)/bilateral  lower extremity Active ROM was WFL (within functional limits)/pt uses accessory motion to compensate to achieve full range

## 2017-06-01 NOTE — PHYSICAL THERAPY INITIAL EVALUATION ADULT - ADDITIONAL COMMENTS
Pt reports he lives alone in a single story home, 1 entry step (+ rail). Pt states prior to admission he was independent with all functional mobility including community ambulation without a device. Pt is right hand dominant, drives, & works in construction (Currently unemployed). Pt reports due to recent onset of symptoms, a friend provided him with a rolling walker to make mobility easier. Pt denies history of diabetes & does not have a glucometer at home. Goal of therapy: return to prior level of function.

## 2017-06-01 NOTE — OCCUPATIONAL THERAPY INITIAL EVALUATION ADULT - ADDITIONAL COMMENTS
Patient lives in a private house with 3 steps to enter. Once inside, the patient main bedroom and bathroom is on that main level. The patient bathroom has a tub/shower combination with a regular toilet and no equipment or devices inside. The patient was ambulating with a rolling walker here and there for 2 weeks prior to hospitalization due to knee pain. Before those 2 weeks, patient was independent with ambulation. Patient does not drive and is able to state wants and needs at this time.

## 2017-06-01 NOTE — PHYSICAL THERAPY INITIAL EVALUATION ADULT - CRITERIA FOR SKILLED THERAPEUTIC INTERVENTIONS
impairments found/functional limitations in following categories/rehab potential/risk reduction/prevention

## 2017-06-01 NOTE — DIETITIAN INITIAL EVALUATION ADULT. - NS AS NUTRI INTERV ED CONTENT
Survival information/Recommended modifications/Purpose of the nutrition education/Provided diabetes diet instruction & CHO control diet & diabetes type 2 nutrition therapy handout material

## 2017-06-02 VITALS
SYSTOLIC BLOOD PRESSURE: 160 MMHG | OXYGEN SATURATION: 100 % | RESPIRATION RATE: 17 BRPM | DIASTOLIC BLOOD PRESSURE: 99 MMHG | HEART RATE: 88 BPM

## 2017-06-02 DIAGNOSIS — R83.6 ABNORMAL CYTOLOGICAL FINDINGS IN CEREBROSPINAL FLUID: ICD-10-CM

## 2017-06-02 DIAGNOSIS — M02.30 REITER'S DISEASE, UNSPECIFIED SITE: ICD-10-CM

## 2017-06-02 DIAGNOSIS — E11.9 TYPE 2 DIABETES MELLITUS WITHOUT COMPLICATIONS: ICD-10-CM

## 2017-06-02 DIAGNOSIS — N17.9 ACUTE KIDNEY FAILURE, UNSPECIFIED: ICD-10-CM

## 2017-06-02 LAB
% ALBUMIN: 39.1 % — SIGNIFICANT CHANGE UP
% ALPHA 1: 9.4 % — SIGNIFICANT CHANGE UP
% ALPHA 2: 21.1 % — SIGNIFICANT CHANGE UP
% BETA: 14.1 % — SIGNIFICANT CHANGE UP
% GAMMA: 16.3 % — SIGNIFICANT CHANGE UP
ALBUMIN SERPL ELPH-MCNC: 1.9 G/DL — LOW (ref 3.3–5)
ALBUMIN SERPL ELPH-MCNC: 2.5 G/DL — LOW (ref 3.6–5.5)
ALBUMIN/GLOB SERPL ELPH: 0.6 RATIO — SIGNIFICANT CHANGE UP
ALP SERPL-CCNC: 207 U/L — HIGH (ref 40–120)
ALPHA1 GLOB SERPL ELPH-MCNC: 0.6 G/DL — HIGH (ref 0.1–0.4)
ALPHA2 GLOB SERPL ELPH-MCNC: 1.4 G/DL — HIGH (ref 0.5–1)
ALT FLD-CCNC: 88 U/L — HIGH (ref 12–78)
ANION GAP SERPL CALC-SCNC: 9 MMOL/L — SIGNIFICANT CHANGE UP (ref 5–17)
AST SERPL-CCNC: 78 U/L — HIGH (ref 15–37)
B-GLOBULIN SERPL ELPH-MCNC: 0.9 G/DL — SIGNIFICANT CHANGE UP (ref 0.5–1)
BILIRUB SERPL-MCNC: 0.2 MG/DL — SIGNIFICANT CHANGE UP (ref 0.2–1.2)
BUN SERPL-MCNC: 38 MG/DL — HIGH (ref 7–23)
CALCIUM SERPL-MCNC: 7.7 MG/DL — LOW (ref 8.5–10.1)
CHLORIDE SERPL-SCNC: 109 MMOL/L — HIGH (ref 96–108)
CHLORIDE UR-SCNC: 82 MMOL/L — SIGNIFICANT CHANGE UP
CO2 SERPL-SCNC: 23 MMOL/L — SIGNIFICANT CHANGE UP (ref 22–31)
CREAT ?TM UR-MCNC: 53 MG/DL — SIGNIFICANT CHANGE UP
CREAT SERPL-MCNC: 1.26 MG/DL — SIGNIFICANT CHANGE UP (ref 0.5–1.3)
GAMMA GLOBULIN: 1.1 G/DL — SIGNIFICANT CHANGE UP (ref 0.6–1.6)
GLUCOSE SERPL-MCNC: 201 MG/DL — HIGH (ref 70–99)
HCT VFR BLD CALC: 29.3 % — LOW (ref 39–50)
HGB BLD-MCNC: 9.7 G/DL — LOW (ref 13–17)
LDH CSF L TO P-CCNC: 19 U/L — SIGNIFICANT CHANGE UP
LDH FLD-CCNC: 19 U/L — SIGNIFICANT CHANGE UP
MCHC RBC-ENTMCNC: 24.2 PG — LOW (ref 27–34)
MCHC RBC-ENTMCNC: 33.2 GM/DL — SIGNIFICANT CHANGE UP (ref 32–36)
MCV RBC AUTO: 73 FL — LOW (ref 80–100)
PLATELET # BLD AUTO: 424 K/UL — HIGH (ref 150–400)
POTASSIUM SERPL-MCNC: 4.1 MMOL/L — SIGNIFICANT CHANGE UP (ref 3.5–5.3)
POTASSIUM SERPL-SCNC: 4.1 MMOL/L — SIGNIFICANT CHANGE UP (ref 3.5–5.3)
POTASSIUM UR-SCNC: 12 MMOL/L — SIGNIFICANT CHANGE UP
PROT ?TM UR-MCNC: 5 MG/DL — SIGNIFICANT CHANGE UP (ref 0–12)
PROT PATTERN SERPL ELPH-IMP: SIGNIFICANT CHANGE UP
PROT SERPL-MCNC: 6.5 GM/DL — SIGNIFICANT CHANGE UP (ref 6–8.3)
PROT/CREAT UR-RTO: 0.1 RATIO — SIGNIFICANT CHANGE UP (ref 0–0.2)
RBC # BLD: 4.02 M/UL — LOW (ref 4.2–5.8)
RBC # FLD: 15.4 % — HIGH (ref 11–15)
SODIUM SERPL-SCNC: 141 MMOL/L — SIGNIFICANT CHANGE UP (ref 135–145)
SODIUM UR-SCNC: 109 MMOL/L — SIGNIFICANT CHANGE UP
WBC # BLD: 12.5 K/UL — HIGH (ref 3.8–10.5)
WBC # FLD AUTO: 12.5 K/UL — HIGH (ref 3.8–10.5)

## 2017-06-02 PROCEDURE — 99233 SBSQ HOSP IP/OBS HIGH 50: CPT

## 2017-06-02 PROCEDURE — 99239 HOSP IP/OBS DSCHRG MGMT >30: CPT

## 2017-06-02 RX ORDER — GLYBURIDE 5 MG
1 TABLET ORAL
Qty: 30 | Refills: 0 | OUTPATIENT
Start: 2017-06-02 | End: 2017-07-02

## 2017-06-02 RX ADMIN — Medication 40 MILLIGRAM(S): at 06:51

## 2017-06-02 RX ADMIN — Medication 650 MILLIGRAM(S): at 06:53

## 2017-06-02 RX ADMIN — HEPARIN SODIUM 5000 UNIT(S): 5000 INJECTION INTRAVENOUS; SUBCUTANEOUS at 13:43

## 2017-06-02 RX ADMIN — HEPARIN SODIUM 5000 UNIT(S): 5000 INJECTION INTRAVENOUS; SUBCUTANEOUS at 06:51

## 2017-06-02 NOTE — DISCHARGE NOTE ADULT - PATIENT PORTAL LINK FT
“You can access the [facility] Patient Portal, offered by [facility], by registering with the following website: [email or web address]”

## 2017-06-02 NOTE — PROGRESS NOTE ADULT - PROBLEM SELECTOR PLAN 4
No baseline for comparison   Will start IVF at 80 ml/hr  Consider further workup if no improvement after hydration and other testing above is normal
Regular diet  General precautions   DVT prophylaxis: Heparin 5000 units SC q8h  Will send routine primary care labs as patient does not have PMD (A1C, lipids)
all viral /infecitous w.u neg so far  call prn

## 2017-06-02 NOTE — DISCHARGE NOTE ADULT - CARE PLAN
Principal Discharge DX:	Ascending paralysis  Goal:	Resolved  Instructions for follow-up, activity and diet:	f/u with PMD in 1 week  Secondary Diagnosis:	Effusion of both knee joints  Goal:	Stable  Instructions for follow-up, activity and diet:	f/u with ortho out pt.  Secondary Diagnosis:	Abnormal renal function  Goal:	Stable  Instructions for follow-up, activity and diet:	f/u with PMD in 1 week  Secondary Diagnosis:	Renal stone  Goal:	Stable  Instructions for follow-up, activity and diet:	f/u with Dr. altamirano within in 2 weeks

## 2017-06-02 NOTE — PROGRESS NOTE ADULT - SUBJECTIVE AND OBJECTIVE BOX
Patient seen in follow up for ELISSA; joint pains improved with prednisone.    MEDICATIONS  (STANDING):  heparin  Injectable 5000Unit(s) SubCutaneous every 8 hours  sodium chloride 0.9%. 1000milliLiter(s) IV Continuous <Continuous>  predniSONE   Tablet 40milliGRAM(s) Oral daily    MEDICATIONS  (PRN):  acetaminophen   Tablet. 650milliGRAM(s) Oral every 6 hours PRN Mild Pain (1 - 3)    PHYSICAL EXAM:      T(C): 36.3, Max: 36.7 (06-01 @ 13:41)  HR: 78 (78 - 102)  BP: 139/91 (116/72 - 153/95)  RR: 16 (16 - 18)  SpO2: 100% (98% - 100%)  Wt(kg): --  Respiratory: clear anteriorly, decreased BS at bases  Cardiovascular: S1 S2  Gastrointestinal: soft NT ND +BS  Extremities:  tr edema                                    9.7    12.5  )-----------( 424      ( 02 Jun 2017 06:28 )             29.3     06-02    141  |  109<H>  |  38<H>  ----------------------------<  201<H>  4.1   |  23  |  1.26    Ca    7.7<L>      02 Jun 2017 06:28    TPro  6.5  /  Alb  1.9<L>  /  TBili  0.2  /  DBili  x   /  AST  78<H>  /  ALT  88<H>  /  AlkPhos  207<H>  06-02      LIVER FUNCTIONS - ( 02 Jun 2017 06:28 )  Alb: 1.9 g/dL / Pro: 6.5 gm/dL / ALK PHOS: 207 U/L / ALT: 88 U/L / AST: 78 U/L / GGT: x             Assessment and Plan:  ELISSA, pre/post renal factors; left staghorn ureteral and bladder calculi; possible gout vs occult CTD.  Uric acid level; IVF; serologies; Rheum/ortho eval;   Will follow.

## 2017-06-02 NOTE — PROGRESS NOTE ADULT - PROBLEM SELECTOR PLAN 2
Follow pending knee XR  Consult ortho for tap if large effusion  rheum consult pending
as above  Rheum on board  w/u in progress  so far all viral w/u positive for past infection only

## 2017-06-02 NOTE — DISCHARGE NOTE ADULT - PLAN OF CARE
Resolved f/u with PMD in 1 week Stable f/u with ortho out pt. f/u with Dr. altamirano within in 2 weeks

## 2017-06-02 NOTE — DISCHARGE NOTE ADULT - MEDICATION SUMMARY - MEDICATIONS TO TAKE
I will START or STAY ON the medications listed below when I get home from the hospital:    predniSONE 10 mg oral tablet  -- 4 tab(s) by mouth once a day and lower by one tab every two days   -- Indication: For Reactive arthritis    glyBURIDE 2.5 mg oral tablet  -- 1 tab(s) by mouth once a day  -- Avoid prolonged or excessive exposure to direct and/or artificial sunlight while taking this medication.  Do not drink alcoholic beverages when taking this medication.  It is very important that you take or use this exactly as directed.  Do not skip doses or discontinue unless directed by your doctor.    -- Indication: For diabetes

## 2017-06-02 NOTE — PROGRESS NOTE ADULT - PROBLEM SELECTOR PLAN 1
Unclear etiology at this time  LP normal thus far, follow pending results  Follow pending radiology as above  Neuro consult with Dr. 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
most likely ereactive arthrits vs early rhematoid arthrits  discussed with dr mildred saleh of rheum and he said patient can Follow up with rheum as outpatient
lieruthanny arthirits ESR and RF elevated  on prednisone  symptomatically improving  doubt septic knee

## 2017-06-02 NOTE — DISCHARGE NOTE ADULT - CARE PROVIDERS DIRECT ADDRESSES
,DirectAddress_Unknown,[email or web address] ,DirectAddress_Unknown

## 2017-06-02 NOTE — DISCHARGE NOTE ADULT - CARE PROVIDER_API CALL
Chandler Samuels), Urology  [address] [place] [address]  Phone: [phone]  Fax: [phone]    Alexx Michele), Internal Medicine; Nephrology  [address] [id number]  Phone: [phone]  Fax: [phone] Chandler Samuels), Urology  [address] [place] [address]  Phone: [phone]  Fax: [phone]

## 2017-06-02 NOTE — PROGRESS NOTE ADULT - SUBJECTIVE AND OBJECTIVE BOX
Patient seen and examined bedside resting comfortably.  No complaints offered. Reports b/l knee pain somewhat improved. He was able to ambulate yesterday.  Voiding spontaneously without difficulty.  Denies hematuria, dysuria and abdominal pain.    T(F): 97.7, Max: 98.1 (06-02 @ 00:17)  HR: 85 (85 - 102)  BP: 132/87 (116/72 - 153/95)  RR: 18 (16 - 18)  SpO2: 100% (98% - 100%)    PHYSICAL EXAM:  General: NAD, alert and awake  HEENT: NCAT, EOMI, conjunctiva clear  Chest: +S1/2 RRR  Lungs: nonlabored respirations, good inspiratory effort. CTAB  Abdomen: soft, NTND.   Extremities: no pedal edema or calf tenderness noted. B/l knee swelling  : no suprapubic tenderness  MS: normal LE strength    LABS:                        9.7    12.5  )-----------( 424      ( 02 Jun 2017 06:28 )             29.3   06-02    141  |  109<H>  |  38<H>  ----------------------------<  201<H>  4.1   |  23  |  1.26    Ca    7.7<L>      02 Jun 2017 06:28    TPro  6.5  /  Alb  1.9<L>  /  TBili  0.2  /  DBili  x   /  AST  78<H>  /  ALT  88<H>  /  AlkPhos  207<H>  06-02      Impression: 57y Male admitted with b/l knee effusions, weakness rule out myositis, found with left ureterocele stone    Plan:  -cont present med management and work up, prednisone, neuro Follow up noted  -will discuss with Dr Samuels regarding OR planning for ureterocele and stone removal

## 2017-06-02 NOTE — PROGRESS NOTE ADULT - SUBJECTIVE AND OBJECTIVE BOX
Patient is a 57y old  Male who presents with a chief complaint of weakness x 6 days; back pain radiating to bilateral knees (31 May 2017 10:09)      INTERVAL HPI / OVERNIGHT EVENTS: knee swelling  and pain significanlty improving    MEDICATIONS  (STANDING):  heparin  Injectable 5000Unit(s) SubCutaneous every 8 hours  sodium chloride 0.9%. 1000milliLiter(s) IV Continuous <Continuous>  predniSONE   Tablet 40milliGRAM(s) Oral daily    MEDICATIONS  (PRN):  acetaminophen   Tablet. 650milliGRAM(s) Oral every 6 hours PRN Mild Pain (1 - 3)      Vital Signs Last 24 Hrs  Tmax:afebrile  HR: 78 (78 - 102)  BP: 139/91 (116/72 - 153/95)  BP(mean): --  RR: 16 (16 - 18)  SpO2: 100% (98% - 100%)    PHYSICAL EXAM:  General :NAD  Constitutional:  well-groomed, well-developed  Respiratory: CTAB/L  Cardiovascular: S1 and S2, RRR, no M/G/R  Gastrointestinal: BS+, soft, NT/ND  Extremities: knee tendness  and swelling improving, LE strength improving  Vascular: 2+ peripheral pulses  Skin: No rashes        LABS:  WBC none  Cr 1.85-->1.46  CSF cells 71  CSF HSV PCR neg  VDRL neg  EBV past infection  CMV and Herpes Past infection  RF 23        MICROBIOLOGY:  RECENT CULTURES:  05-31 .CSF CSF XXXX   No organisms seen  No polymorphonuclear leukocytes seen  by cytocentrifuge   No growth          RADIOLOGY & ADDITIONAL STUDIES:

## 2017-06-02 NOTE — PROGRESS NOTE ADULT - SUBJECTIVE AND OBJECTIVE BOX
CHIEF COMPLAINT/INTERVAL HISTORY:    Patient is a 57y old  Male who presents with a chief complaint of weakness x 6 days; back pain radiating to bilateral knees (02 Jun 2017 12:59)      HPI:  57 year old man with no PMH presents with 6 days of upper ext weakness and atrophy, b/l knee pain and swelling, and blurred vision.  He states that these symptoms started suddenly and without any recent viral-like symptoms, travel, dysuria, bowel or bladder incontinence, numbness, tingling, fever, chills, cough, nausea, vomiting, diarrhea, rash, hair loss, weight loss, chest pain, palpitations, SOB, lightheadedness, Vertigo/Ataxia. (31 May 2017 06:29)    Overnight issues  patient with improveed pain on prednisone and able to walk w/o assistance     SUBJECTIVE & OBJECTIVE: Pt seen and examined at bedside.   ROS:  CONSTITUTIONAL: No fever, weight loss, or fatigue  NECK: No pain or stiffness  RESPIRATORY: No cough, wheezing, chills or hemoptysis; No shortness of breath  CARDIOVASCULAR: No chest pain, palpitations, dizziness, or leg swelling  GASTROINTESTINAL: No abdominal or epigastric pain. No nausea, vomiting, or hematemesis; No diarrhea or constipation. No melena or hematochezia.  GENITOURINARY: No dysuria, frequency, hematuria, or incontinence  NEUROLOGICAL: No headaches, memory loss, loss of strength, numbness, or tremors  SKIN: No itching, burning, rashes, or lesions   ICU Vital Signs Last 24 Hrs  T(C): 36.3, Max: 36.7 (06-02 @ 00:17)  T(F): 97.4, Max: 98.1 (06-02 @ 00:17)  HR: 88 (78 - 102)  BP: 160/99 (116/72 - 160/99)  BP(mean): --  ABP: --  ABP(mean): --  RR: 17 (16 - 18)  SpO2: 100% (98% - 100%)        MEDICATIONS  (STANDING):  heparin  Injectable 5000Unit(s) SubCutaneous every 8 hours  sodium chloride 0.9%. 1000milliLiter(s) IV Continuous <Continuous>  predniSONE   Tablet 40milliGRAM(s) Oral daily    MEDICATIONS  (PRN):  acetaminophen   Tablet. 650milliGRAM(s) Oral every 6 hours PRN Mild Pain (1 - 3)        PHYSICAL EXAM:    GENERAL: NAD, well-groomed, well-developed  HEAD:  Atraumatic, Normocephalic  EYES: EOMI, PERRLA, conjunctiva and sclera clear  ENMT: Moist mucous membranes  NECK: Supple, No JVD  NERVOUS SYSTEM:  Alert & Oriented X3, Motor Strength 5/5 B/L upper and lower extremities; DTRs 2+ intact and symmetric  CHEST/LUNG: Clear to auscultation bilaterally; No rales, rhonchi, wheezing, or rubs  HEART: Regular rate and rhythm; No murmurs, rubs, or gallops  ABDOMEN: Soft, Nontender, Nondistended; Bowel sounds present  EXTREMITIES:  2+ Peripheral Pulses, No clubbing, cyanosis, or edema    LABS:                        9.7    12.5  )-----------( 424      ( 02 Jun 2017 06:28 )             29.3     06-02    141  |  109<H>  |  38<H>  ----------------------------<  201<H>  4.1   |  23  |  1.26    Ca    7.7<L>      02 Jun 2017 06:28    TPro  6.5  /  Alb  1.9<L>  /  TBili  0.2  /  DBili  x   /  AST  78<H>  /  ALT  88<H>  /  AlkPhos  207<H>  06-02          CAPILLARY BLOOD GLUCOSE      RECENT CULTURES:      RADIOLOGY & ADDITIONAL TESTS:  Imaging Personally Reviewed:  [ ] YES      Consultant(s) Notes Reviewed:  [ ] YES     Care Discussed with [ ] Consultants [X ] Patient [ ] Family  [x ]    [x ]  Other; RN  HEALTH ISSUES - PROBLEM Dx:  Abnormal CSF cytology: Abnormal CSF cytology  ELISSA (acute kidney injury): ELISSA (acute kidney injury)  Reactive arthritis: Reactive arthritis  Gammopathy: Gammopathy  Abnormal renal function: Abnormal renal function  Preventive measure: Preventive measure  Need for hepatitis C screening test: Need for hepatitis C screening test  Effusion of both knee joints: Effusion of both knee joints  Ascending paralysis: Ascending paralysis        DVT/GI ppx  Discussed with pt @ bedside

## 2017-06-02 NOTE — DISCHARGE NOTE ADULT - HOSPITAL COURSE
57 yr old male seen admitted with Effusion of both knee joints. Reactive arthritis. ELISSA, pre/post renal factors; left staghorn ureteral and bladder calculi; possible gout vs occult CTD.  on prednisone  symptomatically improving  Clinically improved    Rheumatology eval Pending 57 yr old male seen admitted with Effusion of both knee joints. Reactive arthritis. ELISSA, pre/post renal factors; left staghorn ureteral and bladder calculi; possible gout vs occult CTD.  on prednisone  symptomatically improving  Clinically improved  Rheumatology eval Pending as an outpatient   patient given his labs to bring to his pmd/rheumatologist

## 2017-06-03 LAB
CULTURE RESULTS: NO GROWTH — SIGNIFICANT CHANGE UP
GRAM STN FLD: SIGNIFICANT CHANGE UP
SPECIMEN SOURCE: SIGNIFICANT CHANGE UP

## 2017-06-07 DIAGNOSIS — E11.9 TYPE 2 DIABETES MELLITUS WITHOUT COMPLICATIONS: ICD-10-CM

## 2017-06-07 DIAGNOSIS — M25.462 EFFUSION, LEFT KNEE: ICD-10-CM

## 2017-06-07 DIAGNOSIS — R53.1 WEAKNESS: ICD-10-CM

## 2017-06-07 DIAGNOSIS — G61.0 GUILLAIN-BARRE SYNDROME: ICD-10-CM

## 2017-06-07 DIAGNOSIS — M25.461 EFFUSION, RIGHT KNEE: ICD-10-CM

## 2017-06-07 DIAGNOSIS — N20.1 CALCULUS OF URETER: ICD-10-CM

## 2017-06-07 DIAGNOSIS — N13.39 OTHER HYDRONEPHROSIS: ICD-10-CM

## 2017-06-07 DIAGNOSIS — Q62.31 CONGENITAL URETEROCELE, ORTHOTOPIC: ICD-10-CM

## 2017-06-07 DIAGNOSIS — N20.0 CALCULUS OF KIDNEY: ICD-10-CM

## 2017-06-07 DIAGNOSIS — R94.4 ABNORMAL RESULTS OF KIDNEY FUNCTION STUDIES: ICD-10-CM

## 2017-06-07 DIAGNOSIS — M02.30 REITER'S DISEASE, UNSPECIFIED SITE: ICD-10-CM

## 2018-01-27 NOTE — DISCHARGE NOTE ADULT - MEDICATION SUMMARY - MEDICATIONS TO CHANGE
normal... I will SWITCH the dose or number of times a day I take the medications listed below when I get home from the hospital:  None

## 2020-04-07 NOTE — ED PROVIDER NOTE - GENITOURINARY NEGATIVE STATEMENT, MLM
no dysuria, no frequency, and no hematuria. Metronidazole Pregnancy And Lactation Text: This medication is Pregnancy Category B and considered safe during pregnancy.  It is also excreted in breast milk.

## 2022-10-11 NOTE — PHYSICAL THERAPY INITIAL EVALUATION ADULT - MODIFIED CLINICAL TEST OF SENSORY INTEGRATION IN BALANCE TEST
MED/SURG
Barthel Index: Feeding Score _10/10__, Bathing Score _0/5__, Grooming Score _0/5__, Dressing Score _0/10__, Bowels Score __10/10_, Bladder Score _10/10__, Toilet Score _5/10__, Transfers Score _5/15__, Mobility Score __0/15_, Stairs Score _0/10__,     Total Score ___40/100

## 2022-11-01 NOTE — OCCUPATIONAL THERAPY INITIAL EVALUATION ADULT - ASSISTIVE DEVICE:SIT/SUPINE, REHAB EVAL
[FreeTextEntry1] : as per last visit, we need the CT scan to gauge the adequacy of the healing response AND we need the MRI to assess the junctional levels to see if those other levels remain free of overt pathology;  return after imaging is obtained; 
no device